# Patient Record
Sex: FEMALE | Race: WHITE | NOT HISPANIC OR LATINO | URBAN - METROPOLITAN AREA
[De-identification: names, ages, dates, MRNs, and addresses within clinical notes are randomized per-mention and may not be internally consistent; named-entity substitution may affect disease eponyms.]

---

## 2020-03-12 ENCOUNTER — APPOINTMENT (RX ONLY)
Dept: URBAN - METROPOLITAN AREA CLINIC 51 | Facility: CLINIC | Age: 70
Setting detail: DERMATOLOGY
End: 2020-03-12

## 2020-03-12 DIAGNOSIS — Z41.9 ENCOUNTER FOR PROCEDURE FOR PURPOSES OTHER THAN REMEDYING HEALTH STATE, UNSPECIFIED: ICD-10-CM

## 2020-03-12 PROCEDURE — ? ADDITIONAL NOTES

## 2020-03-12 PROCEDURE — ? MEDICAL CONSULTATION: FILLERS

## 2020-03-12 NOTE — PROCEDURE: ADDITIONAL NOTES
Detail Level: Simple
Additional Notes: For facial telangiectasias, referral for laser consultation, possibly Dr. Maki or Dr. Guadarrama. For milia, referred to [facility]. For neck fullness (distal neck), refer for cool sculpting consult (patient given information for Dr. Conway). Voluma in cheeks and juvederm ultra xc.

## 2024-09-18 ENCOUNTER — APPOINTMENT (RX ONLY)
Dept: URBAN - METROPOLITAN AREA CLINIC 51 | Facility: CLINIC | Age: 74
Setting detail: DERMATOLOGY
End: 2024-09-18

## 2024-09-18 DIAGNOSIS — Z41.9 ENCOUNTER FOR PROCEDURE FOR PURPOSES OTHER THAN REMEDYING HEALTH STATE, UNSPECIFIED: ICD-10-CM

## 2024-09-18 PROCEDURE — ? MEDICAL CONSULTATION: FILLERS

## 2024-09-18 PROCEDURE — ? ADDITIONAL NOTES

## 2024-09-18 NOTE — PROCEDURE: ADDITIONAL NOTES
Detail Level: Simple
Additional Notes: Voluma in cheeks and juvederm vobella around mouth
Render Risk Assessment In Note?: no